# Patient Record
Sex: FEMALE | Race: WHITE | Employment: FULL TIME | URBAN - METROPOLITAN AREA
[De-identification: names, ages, dates, MRNs, and addresses within clinical notes are randomized per-mention and may not be internally consistent; named-entity substitution may affect disease eponyms.]

---

## 2022-10-27 ENCOUNTER — FOLLOWUP TELEPHONE ENCOUNTER (OUTPATIENT)
Dept: MAMMOGRAPHY | Age: 72
End: 2022-10-27

## 2022-10-27 ENCOUNTER — HOSPITAL ENCOUNTER (OUTPATIENT)
Age: 72
Discharge: HOME | End: 2022-10-27

## 2022-10-27 NOTE — TELEPHONE ENCOUNTER
Secondary communication was facilitated as Mrs. Triny Perez accompanied her daughter to her Hereditary Cancer Program Visit. The [facility]  New Visit Clinic Note      Patient Name: Porsha Negrete            YOB: 1950    Date of Conversation: 10/27/2022          Mrs. Triny Perez accompanied her daughter, Ruy Holden, who was referred by Jo Ann Smith MD for genetic counseling due to her personal and family history of breast, colon and uterine cancer. Lamont Lamas, Licensed Genetic Counselor, spent 40 minutes collecting and reviewing personal and family medical information, providing genetic risk assessment, genetic counseling and psychosocial assessment. Personal and Family History by patient report:   Porsha Negrete -  diagnosed with breast cancer at age 60, treated with lumpectomy, chemo, radiation and concurrently with a colon cancer at 60 treated with polypectomy/resection only as very early stage  Sister diagnosed with breast cancer three times, the earliest at age 44 and treated with bilateral mastectomy   Mother grandmother diagnosed with uterine cancer at age 37  Uncle with lung cancer associated with smoking       Ancestry:   Jew ancestry: not reported     Cancer Risk Assessment and Genetic Testing: We had a detailed discussion surrounding the concepts of hereditary, familial and sporadic cancer. Regarding the hereditary forms of cancer, autosomal dominant inheritance was reviewed. We briefly discussed potential changes in screening and management guidelines that could occur, based on genetic testing results. After reviewing the medical and family histories and risk assessment, we discussed genetic testing options. Specifically, we talked about currently available single-gene and multi-gene panel testing options, including benefits and limitations.  I explained possible results of genetic testing including positive, negative or a variant of uncertain significance, and briefly discussed the implications of each. Mrs. Micah Denton (NCCN) criteria for testing based on her personal diagnosis of breast cancer and multiple first degree relatives with breast cancer, including under the age of 39. We discussed insurance discrimination after genetic testing. It was reviewed that there was federal legislation known as The [employer] Non-Discrimination Act (MEGHNA) which went into effect in 2008. MEGHNA created new protections against the misuse of genetic information by health insurance companies and employers. MEGHNA makes it illegal for health insurance companies to deny coverage or to charge a higher rate or premium to an otherwise healthy individual based on genetic test results or family health history. MEGHNA applies to group health insurance, individual health insurance and Medicare. MEGHNA does not apply to long term care insurance, disability insurance, life insurance or insurance for [employer] personnel/[employer]. Beneficiaries, the [employer] and [employer] employees who receive care through the [employer]. MEGHNA also makes it illegal for employers to use this information when making employment decisions such as hiring, firing, promotions, or any other terms of employment. MEGHNA does not apply to employers with less than 15 employees. Mrs. Joshua Rivera elected to proceed with the [address] Expanded (68) gene panel from [facility]. A blood sample was collected at our appointment. Barring insurance or laboratory delay, results will be available 2-3 weeks after receipt of the sample in the laboratory. She will be notified of the result as soon as results are available. A follow up appointment may be scheduled to discuss further screening and management guidelines, based on these results. I remain available to Mrs. Hernandez and her healthcare providers should they have any questions or concerns. I may be reached in the [address] at [phone] (direct) or [phone].     Sincerely,    Parker Soria MS, [facility]  Licensed Genetic Counselor  The [address] [place] [address]

## 2022-11-10 ENCOUNTER — FOLLOWUP TELEPHONE ENCOUNTER (OUTPATIENT)
Dept: MAMMOGRAPHY | Age: 72
End: 2022-11-10

## 2022-11-10 NOTE — TELEPHONE ENCOUNTER
THE [facility]EDIT[facility] PROGRAM     Nayana Carvalho  : 1950  MRN: [id number]      This letter serves to document our telephone conversation and as written explanation of your genetic testing results. You underwent genetic testing through [facility] for the [facility] Expanded gene panel. No pathogenic variants (harmful genetic changes) were detected in any of the 77 genes tested. 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 Specifically, this variant is  [id number].[id number] (c.[id number]). The term \"VUS\" means that a change was identified in the LZTR1 gene however, it is unclear if this change affects cancer risks or if it is benign. Because no deleterious mutations were detected on your genetic testing, this could mean that:  There may be no hereditary risk for cancer in your family. The cancers in you and/or your family may be due to other genetic or environmental factors; A pathogenic variant in one of these cancer risk genes may exist in your family, but you have not inherited it; You may have a pathogenic variant in one of these cancer risk genes which cannot currently be detected; There may be an entirely different cancer risk gene involved in your family for which testing is not yet available or was not pursued. The variant of uncertain significance detected in the LZTR1 gene may be reclassified as a pathogenic variant in the future. At this time, we do not know whether or not this result increases your risk for cancer. 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 This result is called a variant of unknown significance (VUS). This result is not positive or negative.  As more data becomes available, this variant will likely be reclassified as either a pathogenic variant associated with an increased risk for cancer or a benign variant not associated with cancer. It is important for you to check with us once year for updated information on this VUS. At this point because the meaning of the LZTR1 variant is unclear, no medical management decisions should be based on these results. Testing of other family members for the LZTR1 variant is not indicated. Because no known pathogenic variant was detected on your genetic testing, your risk to develop cancer is based on your family history. Given the occurrence of breast, colon, and other cancer in your family, your risk may be higher than the general population risk. See below for our cancer screening recommendations. FOR BREAST SCREENING/RISK REDUCTION:  Discuss these results with your doctors as you make your treatment decisions and continue your follow up care as recommended by your doctors. Monthly self-breast examination  Semi- annual clinical breast examination    Annual mammogram starting 10 years earlier than the youngest breast cancer diagnosis in the family. Women in your family should begin breast screening at age 34 (based on your sister's diagnosis). Annual breast MRI may be an option for women in your family. You should discuss this option with your doctor. Consider option of risk reducing removal of breast tissue (mastectomy) due to the increased breast cancer risk based on your family history. FOR GYNECOLOGIC SCREENING/RISK REDUCTION:  You should share these results with your doctors and continue your follow up care as they recommend. Transvaginal ultrasounds and a blood test to measure CA-125 levels every 6-12 months. This screening is neither sensitive nor specific, meaning that often early stage ovarian cancer cannot be diagnosed through this screening. Screening can also be falsely positive with no cancer present.   Therefore, women in your family are encouraged to further speak with their physicians regarding the benefits and limitations of ovarian cancer screening in the setting of an increased risk for ovarian cancer. As we discussed,  a prophylactic total abdominal hysterectomy (DUNCAN) and bilateral salpingo-oophorectomy (BSO), or having the ovaries and fallopian tubes removed, is the best risk reducing option for ovarian cancer. Women in your family are encouraged to discuss the pros and cons of this surgery with their physicians. FOR SKIN SCREENING:  Regular self-check of your skin and regular use of sunscreen  Reports suspicious findings to your physician  Annual skin exam by a dermatologist     FOR PANCREATIC CANCER RISK REDUCTION:  Do not smoke  There are currently no widely used pancreatic cancer screening practices, your family may consider setting up an appointment with the [facility]:  Continue regular colonoscopy as recommended by your doctors  Annual fecal occult blood test (test for blood in your stool) starting at age 48. PROMPT Study: You will find enclosed with your test results an information sheet on the PROMPT Study. This is a research study designed for patients who had testing done with a multi-gene panel. It is a public database where you can enter your test results, along with others who have had a multi-gene panel. Collecting this type of data will help in the efforts to learn more information about VUS results and mutations in genes that are not yet well understood. This study is completely voluntary. Follow-up plan for your family:  We do not recommend testing for the unclear variant in unaffected family members. Your family has some increased risk to develop breast cancer given your own diagnosis. Women in your family should begin breast cancer screening at age 34. Other members of your family may still carry a pathogenic variant in one of these genes that you did not inherit. We are happy to coordinate any additional testing for your family.   We encourage you to notify us if there are any changes in your family history so that we can assess if additional testing is appropriate. Genetics is an evolving field and new genetic tests may be available for you in the future. It has been a pleasure to work with you and your family. Should there be further questions or concerns we can be reached at [phone]. In the future you may be contacted to be asked to participate in research opportunities through [facility]. These opportunities are entirely voluntary and you will not be obligated to participate. Please let us know of any changes in your contact information so that we may keep you informed of research opportunities and updates with genetic testing.     Sincerely,       Nikki Porras  Licensed Genetic Counselor  The [facility]  Division of [employer]    Results to be sent by standard mail  [employer]   [phone]   [place] [id number]    CC (upon patient request):  Diamond Kat MD